# Patient Record
Sex: FEMALE
[De-identification: names, ages, dates, MRNs, and addresses within clinical notes are randomized per-mention and may not be internally consistent; named-entity substitution may affect disease eponyms.]

---

## 2017-02-08 ENCOUNTER — HOSPITAL (OUTPATIENT)
Dept: OTHER | Age: 57
End: 2017-02-08
Attending: EMERGENCY MEDICINE

## 2017-02-08 ENCOUNTER — DIAGNOSTIC TRANS (OUTPATIENT)
Dept: OTHER | Age: 57
End: 2017-02-08

## 2017-02-08 LAB
ALBUMIN SERPL-MCNC: 4.3 GM/DL (ref 3.6–5.1)
ALP SERPL-CCNC: 81 UNIT/L (ref 45–117)
ALT SERPL-CCNC: 107 UNIT/L
ANALYZER ANC (IANC): ABNORMAL
ANION GAP SERPL CALC-SCNC: 16 MMOL/L (ref 10–20)
AST SERPL-CCNC: 171 UNIT/L
BASOPHILS # BLD: 0 THOUSAND/MCL (ref 0–0.3)
BASOPHILS NFR BLD: 0 %
BILIRUB CONJ SERPL-MCNC: 0.3 MG/DL (ref 0–0.2)
BILIRUB SERPL-MCNC: 0.9 MG/DL (ref 0.2–1)
BUN SERPL-MCNC: 18 MG/DL (ref 10–20)
BUN/CREAT SERPL: 24 (ref 7–25)
CALCIUM SERPL-MCNC: 9.4 MG/DL (ref 8.4–10.2)
CHLORIDE: 105 MMOL/L (ref 98–107)
CO2 SERPL-SCNC: 24 MMOL/L (ref 21–32)
CREAT SERPL-MCNC: 0.74 MG/DL (ref 0.51–0.95)
DIFFERENTIAL METHOD BLD: ABNORMAL
EOSINOPHIL # BLD: 0 THOUSAND/MCL (ref 0.1–0.5)
EOSINOPHIL NFR BLD: 0 %
ERYTHROCYTE [DISTWIDTH] IN BLOOD: 12.5 % (ref 11–15)
GLUCOSE SERPL-MCNC: 115 MG/DL (ref 65–99)
HEMATOCRIT: 40.2 % (ref 36–46.5)
HGB BLD-MCNC: 13.8 GM/DL (ref 12–15.5)
LIPASE SERPL-CCNC: 122 UNIT/L (ref 73–393)
LYMPHOCYTES # BLD: 1.7 THOUSAND/MCL (ref 1–4)
LYMPHOCYTES NFR BLD: 17 %
MCH RBC QN AUTO: 34 PG (ref 26–34)
MCHC RBC AUTO-ENTMCNC: 34.3 GM/DL (ref 32–36.5)
MCV RBC AUTO: 99 FL (ref 78–100)
MONOCYTES # BLD: 0.8 THOUSAND/MCL (ref 0.3–0.9)
MONOCYTES NFR BLD: 7 %
NEUTROPHILS # BLD: 7.6 THOUSAND/MCL (ref 1.8–7.7)
NEUTROPHILS NFR BLD: 76 %
NEUTS SEG NFR BLD: ABNORMAL %
PERCENT NRBC: ABNORMAL
PLATELET # BLD: 432 THOUSAND/MCL (ref 140–450)
POTASSIUM SERPL-SCNC: 3.8 MMOL/L (ref 3.4–5.1)
PROT SERPL-MCNC: 7.7 GM/DL (ref 6.4–8.2)
RBC # BLD: 4.06 MILLION/MCL (ref 4–5.2)
SODIUM SERPL-SCNC: 141 MMOL/L (ref 135–145)
TROPONIN I SERPL HS-MCNC: <0.02 NG/ML
WBC # BLD: 10.1 THOUSAND/MCL (ref 4.2–11)

## 2017-06-06 ENCOUNTER — OFFICE VISIT (OUTPATIENT)
Dept: DERMATOLOGY CLINIC | Facility: CLINIC | Age: 57
End: 2017-06-06

## 2017-06-06 DIAGNOSIS — D23.5 DYSPLASTIC NEVUS OF TRUNK: Primary | ICD-10-CM

## 2017-06-06 DIAGNOSIS — D23.5 BENIGN NEOPLASM OF SKIN OF TRUNK, EXCEPT SCROTUM: ICD-10-CM

## 2017-06-06 DIAGNOSIS — L81.4 SOLAR LENTIGO: ICD-10-CM

## 2017-06-06 DIAGNOSIS — L82.1 SEBORRHEIC KERATOSES: ICD-10-CM

## 2017-06-06 DIAGNOSIS — D23.72 DYSPLASTIC NEVUS OF LOWER EXTREMITY, LEFT: ICD-10-CM

## 2017-06-06 PROBLEM — D23.70 DYSPLASTIC NEVUS OF LOWER EXTREMITY: Status: ACTIVE | Noted: 2017-06-06

## 2017-06-06 PROCEDURE — 99213 OFFICE O/P EST LOW 20 MIN: CPT | Performed by: DERMATOLOGY

## 2017-06-06 PROCEDURE — 99212 OFFICE O/P EST SF 10 MIN: CPT | Performed by: DERMATOLOGY

## 2017-06-06 NOTE — PROGRESS NOTES
HPI:     Chief Complaint     Lesion        HPI     Lesion    Additional comments: pt here; mildly dysplastic nevi X 2 12/5/2016. ... observation vs definitive excision (broke left forearm 2/21 needing surgery)       Last edited by Tito Kyle RN on 6/6/201 Comment: 1-2  drink daily    Drug Use: No    Sexual Activity: Not on file   Not on file  Other Topics Concern    Pt has a pacemaker No    Pt has a defibrillator No    Reaction to local anesthetic No    Caffeine Concern Yes    Comment: 2-3 cups of tea da orders of the defined types were placed in this encounter. Results From Past 48 Hours:  No results found for this or any previous visit (from the past 48 hour(s)).     Meds This Visit:      Imaging Orders:  None     Referral Orders:  No orders of the

## 2017-06-06 NOTE — PROGRESS NOTES
Past Medical History   Diagnosis Date   • Hypoglycemia      in her 18's   • Optic neuritis, right    • Dysplastic nevus 2016     right lower back, lateral, Compound dysplastic nevus, mild dysplasia   • Dysplastic nevus 2016     left lateral thigh, Lentigin

## 2018-07-09 ENCOUNTER — HOSPITAL ENCOUNTER (OUTPATIENT)
Dept: ULTRASOUND IMAGING | Facility: HOSPITAL | Age: 58
Discharge: HOME | End: 2018-07-09
Attending: OTOLARYNGOLOGY
Payer: COMMERCIAL

## 2018-07-09 DIAGNOSIS — E04.1 THYROID NODULE: ICD-10-CM

## 2018-07-09 PROCEDURE — 76536 US EXAM OF HEAD AND NECK: CPT | Performed by: OTOLARYNGOLOGY

## 2018-11-15 ENCOUNTER — OFFICE VISIT (OUTPATIENT)
Dept: DERMATOLOGY CLINIC | Facility: CLINIC | Age: 58
End: 2018-11-15
Payer: COMMERCIAL

## 2018-11-15 DIAGNOSIS — D23.60 BENIGN NEOPLASM OF SKIN OF UPPER LIMB, INCLUDING SHOULDER, UNSPECIFIED LATERALITY: ICD-10-CM

## 2018-11-15 DIAGNOSIS — D23.4 BENIGN NEOPLASM OF SCALP AND SKIN OF NECK: ICD-10-CM

## 2018-11-15 DIAGNOSIS — D23.5 BENIGN NEOPLASM OF SKIN OF TRUNK, EXCEPT SCROTUM: ICD-10-CM

## 2018-11-15 DIAGNOSIS — Z86.018 HISTORY OF DYSPLASTIC NEVUS: Primary | ICD-10-CM
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
## 2018-11-15 DIAGNOSIS — D23.30 BENIGN NEOPLASM OF SKIN OF FACE: ICD-10-CM

## 2018-11-15 DIAGNOSIS — D23.70 BENIGN NEOPLASM OF SKIN OF LOWER LIMB, INCLUDING HIP, UNSPECIFIED LATERALITY: ICD-10-CM

## 2018-11-15 DIAGNOSIS — L81.4 SOLAR LENTIGO: ICD-10-CM

## 2018-11-15 DIAGNOSIS — L82.1 SEBORRHEIC KERATOSES: ICD-10-CM

## 2018-11-15 PROCEDURE — 99212 OFFICE O/P EST SF 10 MIN: CPT | Performed by: DERMATOLOGY

## 2018-11-15 PROCEDURE — 99213 OFFICE O/P EST LOW 20 MIN: CPT | Performed by: DERMATOLOGY

## 2018-11-15 NOTE — PROGRESS NOTES
Past Medical History:   Diagnosis Date   • Dysplastic nevus 2016    right lower back, lateral, Compound dysplastic nevus, mild dysplasia   • Dysplastic nevus 2016    left lateral thigh, Lentiginous junctional dysplastic nevus, mild dysplasia    • Hypoglyce Asked        Self-Exams: Not Asked    Social History Narrative      Not on file    Family History   Problem Relation Age of Onset   • Hypertension Father         and brothers   • Heart Disease Father    • Other (Other) Father         Lupus   • Heart Diseas

## 2018-11-15 NOTE — PROGRESS NOTES
HPI:     Chief Complaint     Full Skin Exam        HPI     Full Skin Exam      Additional comments: LOV: 06/06/17. Pt presents for a full skin exam. Pt has a personal hx of Dysplastic Nevus.            Last edited by Mian Salcido on 11/15/2018  8:51 A Not on file      Years of education: Not on file      Highest education level: Not on file    Social Needs      Financial resource strain: Not on file      Food insecurity - worry: Not on file      Food insecurity - inability: Not on file      Transportati neck, chest , back, abdomen, r upper extremity, l upper extremity, buttocks, genital area, l lower extremity and right lower extremity. Also exam of scalp    The patient is alert, oriented, and appears their stated age.   Patient is well nourished and in n encounter.         11/15/2018  Lyle Cho

## 2019-05-09 ENCOUNTER — OFFICE VISIT (OUTPATIENT)
Dept: DERMATOLOGY CLINIC | Facility: CLINIC | Age: 59
End: 2019-05-09
Payer: COMMERCIAL

## 2019-05-09 DIAGNOSIS — Z86.018 HISTORY OF DYSPLASTIC NEVUS: ICD-10-CM

## 2019-05-09 DIAGNOSIS — L73.9 FOLLICULITIS: ICD-10-CM

## 2019-05-09 DIAGNOSIS — D48.5 NEOPLASM OF UNCERTAIN BEHAVIOR OF SKIN: Primary | ICD-10-CM

## 2019-05-09 PROCEDURE — 99212 OFFICE O/P EST SF 10 MIN: CPT | Performed by: DERMATOLOGY

## 2019-05-09 PROCEDURE — 88305 TISSUE EXAM BY PATHOLOGIST: CPT | Performed by: DERMATOLOGY

## 2019-05-09 PROCEDURE — 11102 TANGNTL BX SKIN SINGLE LES: CPT | Performed by: DERMATOLOGY

## 2019-05-09 NOTE — PROGRESS NOTES
HPI:     Chief Complaint     Lesion        HPI     Lesion      Additional comments: LOV 11/15/2018- pt presents for new lesion on chest, noticed about 1 month ago very itchy and paiful, currently not using any creams               Lesion      Additional co Surgical History:   Procedure Laterality Date   • CHOLECYSTECTOMY     • OTHER      Thyroid Nodule Biopsy     Social History    Socioeconomic History      Marital status:       Spouse name: Not on file      Number of children: Not on file      Years Occupational Exposure: Not Asked        Hobby Hazards: Not Asked        Sleep Concern: Not Asked        Stress Concern: Not Asked        Weight Concern: Not Asked        Special Diet: Not Asked        Back Care: Not Asked        Exercise: Yes          4-5 this or any previous visit (from the past 48 hour(s)). Meds This Visit:      Imaging Orders:  None     Referral Orders:  No orders of the defined types were placed in this encounter.         5/9/2019  Adrian Crisostomo

## 2021-05-13 ENCOUNTER — OFFICE VISIT (OUTPATIENT)
Dept: DERMATOLOGY CLINIC | Facility: CLINIC | Age: 61
End: 2021-05-13
Payer: COMMERCIAL

## 2021-05-13 DIAGNOSIS — D22.9 MULTIPLE MELANOCYTIC NEVI: ICD-10-CM

## 2021-05-13 DIAGNOSIS — Z86.018 HISTORY OF DYSPLASTIC NEVUS: ICD-10-CM

## 2021-05-13 DIAGNOSIS — L82.1 SEBORRHEIC KERATOSES: ICD-10-CM

## 2021-05-13 DIAGNOSIS — L57.0 ACTINIC KERATOSIS: ICD-10-CM

## 2021-05-13 DIAGNOSIS — L81.4 SOLAR LENTIGO: ICD-10-CM

## 2021-05-13 DIAGNOSIS — Z12.83 SKIN CANCER SCREENING: Primary | ICD-10-CM

## 2021-05-13 PROCEDURE — 17000 DESTRUCT PREMALG LESION: CPT | Performed by: DERMATOLOGY

## 2021-05-13 PROCEDURE — 17003 DESTRUCT PREMALG LES 2-14: CPT | Performed by: DERMATOLOGY

## 2021-05-13 PROCEDURE — 99396 PREV VISIT EST AGE 40-64: CPT | Performed by: DERMATOLOGY

## 2021-05-13 NOTE — PROGRESS NOTES
HPI:     Chief Complaint     Full Skin Exam        HPI     Full Skin Exam      Additional comments: DXZ:9/9/3162. Present for full body exam. No personal hx of skin cancer. No concerns.            Last edited by Mian Valdez on 5/13/2021  9:52 AM. Alcohol use:  Yes        Alcohol/week: 0.0 standard drinks        Comment: 1-2  drink daily      Drug use: No      Sexual activity: Not on file    Other Topics      Concerns:        Grew up on a farm: Not Asked        History of tanning: Not Asked        Derrell Kinney Disease Father    • Other (Other) Father         Lupus   • Heart Disease Paternal Grandmother    • Skin cancer Maternal Grandfather         unknown type   • Other (Other) Paternal Grandfather         Lung Cancer- smoker   • Other (Other) Maternal Grandmoth every few hours.   Seborrheic keratoses-diagnosis discussed–reassurance–no treatment  Multiple melanocytic nevi    Orders Placed This Encounter      DESTRUCTION PREMALIGNANT LESIONS, FIRST LES      DESTRUCTION PREMALIGNANT LESIONS, 2ND - 14      Results Fro

## 2022-05-12 ENCOUNTER — OFFICE VISIT (OUTPATIENT)
Dept: DERMATOLOGY CLINIC | Facility: CLINIC | Age: 62
End: 2022-05-12
Payer: COMMERCIAL

## 2022-05-12 DIAGNOSIS — L57.0 ACTINIC KERATOSIS: Primary | ICD-10-CM

## 2022-05-12 DIAGNOSIS — L82.1 SEBORRHEIC KERATOSES: ICD-10-CM

## 2022-05-12 DIAGNOSIS — D23.9 BENIGN NEOPLASM OF SKIN, UNSPECIFIED LOCATION: ICD-10-CM

## 2022-05-12 DIAGNOSIS — L81.4 SOLAR LENTIGO: ICD-10-CM

## 2022-05-12 DIAGNOSIS — Z86.018 HISTORY OF DYSPLASTIC NEVUS: ICD-10-CM

## 2022-05-12 DIAGNOSIS — D22.9 MULTIPLE MELANOCYTIC NEVI: ICD-10-CM

## 2023-10-19 ENCOUNTER — OFFICE VISIT (OUTPATIENT)
Dept: DERMATOLOGY CLINIC | Facility: CLINIC | Age: 63
End: 2023-10-19

## 2023-10-19 DIAGNOSIS — D22.9 MULTIPLE MELANOCYTIC NEVI: ICD-10-CM

## 2023-10-19 DIAGNOSIS — L57.0 ACTINIC KERATOSIS: Primary | ICD-10-CM

## 2023-10-19 DIAGNOSIS — D23.9 BENIGN NEOPLASM OF SKIN, UNSPECIFIED LOCATION: ICD-10-CM

## 2023-10-19 DIAGNOSIS — Z86.018 HISTORY OF DYSPLASTIC NEVUS: ICD-10-CM

## 2023-10-19 DIAGNOSIS — L81.4 SOLAR LENTIGO: ICD-10-CM

## 2023-10-19 DIAGNOSIS — L82.1 SEBORRHEIC KERATOSES: ICD-10-CM

## 2023-10-19 PROCEDURE — 99213 OFFICE O/P EST LOW 20 MIN: CPT | Performed by: DERMATOLOGY

## 2023-10-19 PROCEDURE — 17000 DESTRUCT PREMALG LESION: CPT | Performed by: DERMATOLOGY

## 2023-10-30 NOTE — PROGRESS NOTES
Flower Wu is a 58year old female. HPI:     CC:  Patient presents with:  Full Skin Exam: Hx of AK, dysplastic nevus. LOV 5/2022. Pt presents for full body exam r/t hx of skin ca. Allergies:  Erythromycin    HISTORY:    Past Medical History:   Diagnosis Date    Dysplastic nevus 2016    right lower back, lateral, Compound dysplastic nevus, mild dysplasia    Dysplastic nevus 2016    left lateral thigh, Lentiginous junctional dysplastic nevus, mild dysplasia     Hypoglycemia     in her 20's    Optic neuritis, right       Past Surgical History:   Procedure Laterality Date    CHOLECYSTECTOMY      OTHER      Thyroid Nodule Biopsy      Family History   Problem Relation Age of Onset    Hypertension Father         and brothers    Heart Disease Father     Other (Other) Father         Lupus    Heart Disease Paternal Grandmother     Skin cancer Maternal Grandfather         unknown type    Other (Other) Paternal Grandfather         Lung Cancer- smoker    Other (Other) Maternal Grandmother         Leukemia      Social History     Socioeconomic History    Marital status:    Occupational History    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      Allergies:     Erythromycin            RASH    Past Medical History:   Diagnosis Date    Dysplastic nevus 2016    right lower back, lateral, Compound dysplastic nevus, mild dysplasia    Dysplastic nevus 2016    left lateral thigh, Lentiginous junctional dysplastic nevus, mild dysplasia     Hypoglycemia     in her 20's    Optic neuritis, right      Past Surgical History:   Procedure Laterality Date    CHOLECYSTECTOMY      OTHER      Thyroid Nodule Biopsy     Social History    Socioeconomic History      Marital status:       Spouse name: Not on file      Number of children: Not on file      Years of education: Not on file      Highest education level: Not on file    Occupational History      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    Tobacco Use      Smoking status: Never      Smokeless tobacco: Never    Substance and Sexual Activity      Alcohol use:  Yes        Alcohol/week: 0.0 standard drinks of alcohol        Comment: 1-2  drink daily      Drug use: No      Sexual activity: Not on file    Other Topics      Concerns:        Grew up on a farm: No        History of tanning: Yes        Outdoor occupation: No        Pt has a pacemaker: No        Pt has a defibrillator: No        Breast feeding: No        Reaction to local anesthetic: No         Service: Not Asked        Blood Transfusions: Not Asked        Caffeine Concern: Yes          2-3 cups of tea daily        Occupational Exposure: Not Asked        Hobby Hazards: Not Asked        Sleep Concern: Not Asked        Stress Concern: Not Asked        Weight Concern: Not Asked        Special Diet: Not Asked        Back Care: Not Asked        Exercise: Yes          4-5 x a week        Bike Helmet: Not Asked        Seat Belt: Not Asked        Self-Exams: Not Asked    Social History Narrative      Not on file    Social Determinants of Health  Financial Resource Strain: Not on file  Food Insecurity: Not on file  Transportation Needs: Not on file  Physical Activity: Not on file  Stress: Not on file  Social Connections: Not on file  Housing Stability: Not on file  Family History   Problem Relation Age of Onset    Hypertension Father         and brothers Heart Disease Father     Other (Other) Father         Lupus    Heart Disease Paternal Grandmother     Skin cancer Maternal Grandfather         unknown type    Other (Other) Paternal Grandfather         Lung Cancer- smoker    Other (Other) Maternal Grandmother         Leukemia       There were no vitals filed for this visit. HPI:    Patient presents with:  Full Skin Exam: Hx of AK, dysplastic nevus. LOV 5/2022. Pt presents for full body exam r/t hx of skin ca. Follow-up AK's dysplastic nevi    Patient plans to move to [facility]  Has been careful with sun protection in general    Past notes/ records and appropriate/relevant lab results including pathology and past body maps reviewed. Including outside notes/ PCP notes as appropriate. Updated and new information noted in current visit. Patient presents with concerns above. Patient has been in their usual state of health. History, medications, allergies reviewed as noted. ROS:  new relevant systemic complaints as noted       Physical Examination:     Well-developed well-nourished patient alert oriented in no acute distress. Exam total-body performed, including scalp, head, neck, face,nails, hair, external eyes, including conjunctival mucosa, eyelids, lips external ears, back, chest,/ breasts, axillae,  abdomen, arms, legs, palms. Multiple light to medium brown, well marginated, uniformly pigmented, macules and papules 6 mm and less scattered on exam. pigmented lesions examined with dermoscopy benign-appearing patterns. Waxy tannish keratotic papules scattered, cherry-red vascular papules scattered. See map today's date for lesions noted . Otherwise remarkable for lesions as noted on map. See details of examination  See Assessment /Plan for additional history and physical exam also:    Assessment / plan:    No orders of the defined types were placed in this encounter.       Meds & Refills for this Visit:  Requested Prescriptions      No prescriptions requested or ordered in this encounter         Actinic keratosis  (primary encounter diagnosis)  Seborrheic keratoses  Multiple melanocytic nevi  Solar lentigo  Benign neoplasm of skin, unspecified location  History of dysplastic nevus    See details on map. Remarkable for:    Erythematous scaling keratotic papules noted at sites noted on map  Actinic Keratoses. Precancerous nature discussed. Sun protection, sunscreen/ blocks encouraged Lesions treated with cryo- . Biopsy if not resolved. Upper chest at sternal notch X1    Extensive benign keratoses lentigos nevi    History of erythema mid cheek consider Aldara in future. Observe carefully  Careful sunscreen sun protection in particular patient will be outdoors. Multiple nevi, benign keratoses over the back,  No other susupicious lesions on todays  exam.    Please refer to map for specific lesions. See additional diagnoses. Pros cons of various therapies, risks benefits discussed. Pathophysiology discussed with patient. Therapeutic options reviewed. See  Medications in grid. Instructions reviewed at length. Benign nevi, seborrheic  keratoses, cherry angiomas:  Reassurance regarding other benign skin lesions. Signs and symptoms of skin cancer, ABCDE's of melanoma discussed with patient. Sunscreen use, sun protection, self exams reviewed. Followup as noted RTC routine checkup 6 mos - one year or p.r.n. Encounter Times Including precharting, reviewing chart, prior notes obtaining history: 10 minutes, medical exam :10 minutes, notes on body map, plan, counseling 10minutes My total time spent caring for the patient on the day of the encounter: 30 minutes     The patient indicates understanding of these issues and agrees to the plan. The patient is asked to return as noted in follow-up/ above. This note was generated using Dragon voice recognition software.   Please contact me regarding any confusion resulting from errors in recognition.

## (undated) NOTE — MR AVS SNAPSHOT
[facility] SPECIAL[facility] - [place] [address] [place]  [id number]  [phone]               Thank you for choosing us for your health care visit with Mary Kramer MD.  We are glad to serve you and happy to provide you with this summary of y